# Patient Record
Sex: FEMALE | Race: WHITE | Employment: FULL TIME | URBAN - METROPOLITAN AREA
[De-identification: names, ages, dates, MRNs, and addresses within clinical notes are randomized per-mention and may not be internally consistent; named-entity substitution may affect disease eponyms.]

---

## 2019-12-05 ENCOUNTER — OFFICE VISIT (OUTPATIENT)
Dept: ORTHOPEDIC SURGERY | Age: 44
End: 2019-12-05
Payer: COMMERCIAL

## 2019-12-05 VITALS
HEIGHT: 64 IN | HEART RATE: 52 BPM | DIASTOLIC BLOOD PRESSURE: 74 MMHG | BODY MASS INDEX: 25.61 KG/M2 | SYSTOLIC BLOOD PRESSURE: 114 MMHG | WEIGHT: 150 LBS

## 2019-12-05 DIAGNOSIS — S46.012A TRAUMATIC COMPLETE TEAR OF LEFT ROTATOR CUFF, INITIAL ENCOUNTER: ICD-10-CM

## 2019-12-05 DIAGNOSIS — M25.512 ACUTE PAIN OF LEFT SHOULDER: Primary | ICD-10-CM

## 2019-12-05 DIAGNOSIS — M75.22 BICEPS TENDINITIS OF LEFT UPPER EXTREMITY: ICD-10-CM

## 2019-12-05 PROCEDURE — 99203 OFFICE O/P NEW LOW 30 MIN: CPT | Performed by: ORTHOPAEDIC SURGERY

## 2019-12-05 RX ORDER — DICYCLOMINE HYDROCHLORIDE 10 MG/1
CAPSULE ORAL
Refills: 1 | COMMUNITY
Start: 2019-11-27

## 2019-12-05 RX ORDER — DIAZEPAM 5 MG/1
5 TABLET ORAL
Qty: 1 TABLET | Refills: 0 | Status: SHIPPED | OUTPATIENT
Start: 2019-12-05 | End: 2019-12-05

## 2019-12-05 ASSESSMENT — ENCOUNTER SYMPTOMS
GASTROINTESTINAL NEGATIVE: 1
EYES NEGATIVE: 1
ALLERGIC/IMMUNOLOGIC NEGATIVE: 1
RESPIRATORY NEGATIVE: 1

## 2019-12-12 ENCOUNTER — OFFICE VISIT (OUTPATIENT)
Dept: ORTHOPEDIC SURGERY | Age: 44
End: 2019-12-12
Payer: COMMERCIAL

## 2019-12-12 VITALS
HEIGHT: 64 IN | WEIGHT: 150 LBS | BODY MASS INDEX: 25.61 KG/M2 | DIASTOLIC BLOOD PRESSURE: 76 MMHG | HEART RATE: 59 BPM | SYSTOLIC BLOOD PRESSURE: 114 MMHG | RESPIRATION RATE: 12 BRPM

## 2019-12-12 DIAGNOSIS — M25.512 ACUTE PAIN OF LEFT SHOULDER: Primary | ICD-10-CM

## 2019-12-12 DIAGNOSIS — M75.42 IMPINGEMENT SYNDROME OF LEFT SHOULDER: ICD-10-CM

## 2019-12-12 DIAGNOSIS — M75.22 BICEPS TENDINITIS OF LEFT UPPER EXTREMITY: ICD-10-CM

## 2019-12-12 PROCEDURE — 99212 OFFICE O/P EST SF 10 MIN: CPT | Performed by: ORTHOPAEDIC SURGERY

## 2019-12-12 RX ORDER — METHYLPREDNISOLONE 4 MG/1
TABLET ORAL
Qty: 1 KIT | Refills: 0 | Status: SHIPPED | OUTPATIENT
Start: 2019-12-12

## 2019-12-23 ENCOUNTER — HOSPITAL ENCOUNTER (OUTPATIENT)
Dept: PHYSICAL THERAPY | Age: 44
Setting detail: THERAPIES SERIES
Discharge: HOME | End: 2019-12-23
Payer: COMMERCIAL

## 2019-12-23 PROCEDURE — 97112 NEUROMUSCULAR REEDUCATION: CPT | Performed by: PHYSICAL THERAPIST

## 2019-12-23 PROCEDURE — 97161 PT EVAL LOW COMPLEX 20 MIN: CPT | Performed by: PHYSICAL THERAPIST

## 2019-12-23 PROCEDURE — 97110 THERAPEUTIC EXERCISES: CPT | Performed by: PHYSICAL THERAPIST

## 2019-12-30 ENCOUNTER — APPOINTMENT (OUTPATIENT)
Dept: PHYSICAL THERAPY | Age: 44
End: 2019-12-30
Payer: COMMERCIAL

## 2023-09-19 DIAGNOSIS — H91.90 HEARING LOSS, UNSPECIFIED HEARING LOSS TYPE, UNSPECIFIED LATERALITY: Primary | ICD-10-CM

## 2023-09-20 NOTE — PROGRESS NOTES
perforation. Acoustic Reflexes: Ipsilateral: Could not maintain seal. Contralateral: Could not maintain seal.    LEFT EAR:  Hearing Sensitivity: Normal hearing sensitivity   Speech Recognition Threshold: 20 dB HL  Word Recognition:Excellent (100%), based on NU-6 25-word list at 55 dBHL using recorded speech stimuli. Tympanometry: Normal peak pressure and compliance, Type A tympanogram, consistent with normal middle ear function. Acoustic Reflexes: Ipsilateral: Could not maintain seal. Contralateral: Could not maintain seal.    Reliability: Good  Transducer: Inserts    See scanned audiogram dated 9/21/2023  for results. PATIENT EDUCATION:     The following items were discussed with the patient:   - Good Communication Strategies  - Hearing Loss and Hearing Aids    Educational information was shared in the After Visit Summary. RECOMMENDATIONS:                                                                                                                                                                                                                                                          The following items are recommended based on patient report and results from today's appointment:   - Continue medical follow-up with Jamison Bullard MD.   - Retest hearing as medically indicated and/or sooner if a change in hearing is noted. - If desired, schedule a Hearing Aid Evaluation (HAE) appointment to discuss hearing aid options. - Utilize \"Good Communication Strategies\" as discussed to assist in speech understanding with communication partners. - Use hearing protection devices (HPDs), such as protective ear muffs and ear plugs, when exposed to dangerous sound levels.        Calixto Guillaume  Audiologist    Chart CC'd to: Jamison Bullard MD      Degree of   Hearing Sensitivity dB Range   Within Normal Limits (WNL) 0 - 20   Mild 25 - 40   Moderate 45 - 55

## 2023-09-20 NOTE — PROGRESS NOTES
Bart Floyd   1975, 52 y.o. female   [id number]     Zac Lopez was seen today, 9/21/2023, for earmold impressions to order custom molds for swim plugs. Pre- and post- impression otoscopy was unremarkable and revealed clear ear canals bilaterally. Date: 9/21/2023     PROCEDURES:      Explained to patient $30.00 impression fee and $75.00 ear mold fee due at time of fitting. Patient reported understanding and decided to continue with ear mold impression today. Pre-impression otoscopy revealed clear canals, AD. Impressions taken without incident, bilaterally. Post-impression otoscopy clear, AD.    RECOMMENDATIONS:     Return for Ear mold fitting as scheduled.      NO CHARGE, will pay in full at     Calixto Davey  Audiologist

## 2023-09-21 ENCOUNTER — OFFICE VISIT (OUTPATIENT)
Dept: ENT CLINIC | Age: 48
End: 2023-09-21
Payer: COMMERCIAL

## 2023-09-21 ENCOUNTER — PROCEDURE VISIT (OUTPATIENT)
Dept: AUDIOLOGY | Age: 48
End: 2023-09-21

## 2023-09-21 ENCOUNTER — PROCEDURE VISIT (OUTPATIENT)
Dept: AUDIOLOGY | Age: 48
End: 2023-09-21
Payer: COMMERCIAL

## 2023-09-21 VITALS
RESPIRATION RATE: 16 BRPM | BODY MASS INDEX: 28.68 KG/M2 | SYSTOLIC BLOOD PRESSURE: 143 MMHG | OXYGEN SATURATION: 98 % | HEIGHT: 64 IN | DIASTOLIC BLOOD PRESSURE: 90 MMHG | WEIGHT: 168 LBS | HEART RATE: 81 BPM

## 2023-09-21 DIAGNOSIS — H90.71 MIXED CONDUCTIVE AND SENSORINEURAL HEARING LOSS OF RIGHT EAR WITH UNRESTRICTED HEARING OF LEFT EAR: Primary | ICD-10-CM

## 2023-09-21 DIAGNOSIS — H66.90 CHRONIC OTITIS MEDIA, UNSPECIFIED OTITIS MEDIA TYPE: Primary | ICD-10-CM

## 2023-09-21 DIAGNOSIS — H90.71 MIXED CONDUCTIVE AND SENSORINEURAL HEARING LOSS OF RIGHT EAR WITH UNRESTRICTED HEARING OF LEFT EAR: ICD-10-CM

## 2023-09-21 PROCEDURE — NBSRV NON-BILLABLE SERVICE: Performed by: AUDIOLOGIST

## 2023-09-21 PROCEDURE — 99203 OFFICE O/P NEW LOW 30 MIN: CPT | Performed by: OTOLARYNGOLOGY

## 2023-09-21 PROCEDURE — 92557 COMPREHENSIVE HEARING TEST: CPT | Performed by: AUDIOLOGIST

## 2023-09-21 PROCEDURE — 1036F TOBACCO NON-USER: CPT | Performed by: OTOLARYNGOLOGY

## 2023-09-21 PROCEDURE — G8419 CALC BMI OUT NRM PARAM NOF/U: HCPCS | Performed by: OTOLARYNGOLOGY

## 2023-09-21 PROCEDURE — G8427 DOCREV CUR MEDS BY ELIG CLIN: HCPCS | Performed by: OTOLARYNGOLOGY

## 2023-09-21 PROCEDURE — 92567 TYMPANOMETRY: CPT | Performed by: AUDIOLOGIST

## 2023-09-21 NOTE — PROGRESS NOTES
[address]      Patient Name: [id number] Record Number:  [id number]  Primary Care Physician:  Oliver Tinsley  Date of Consultation: 9/21/2023    Chief Complaint: Right ear issues        HISTORY OF PRESENT Ninoska Donohue is a(n) 52 y.o. female who presents for evaluation of right ear issues. The patient has had a long history of ear issues. She had frequent ear infections as a child. She has had multiple different ear tubes. When she got older it was really just the right ear. Over a decade ago she had a more permanent tube placed on the right side. She has not had issues in a long time. She has not had drainage or pain. The left ear has been fine. She feels as though she has some hearing loss on the right side. 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 REVIEW OF SYSTEMS  As above  PHYSICAL EXAM  GENERAL: No Acute Distress, Alert and Oriented, no Hoarseness, strong voice  EYES: EOMI, Anti-icteric  HENT:   Head: Normocephalic and atraumatic. Face:  Symmetric, facial nerve intact, no sinus tenderness  Ears: See below  Mouth/Oral Cavity:  normal lips, Uvula is midline, no mucosal lesions, no trismus  Oropharynx/Larynx:  normal oropharynx  Nose:Normal external nasal appearance. NECK: Normal range of motion, no thyromegaly, trachea is midline, no lymphadenopathy, no neck masses, no crepitus      Patient had an audiogram.  On the right side she has mild mixed hearing loss. On the left side she has normal hearing    Bilateral ear exam.  The right ear was visualized binocular scope. A little bit of wax removed from an ear tube. The ear tube is in place and patent with a dry middle ear. On the left side tympanic membrane intact with aerated middle ear    I reviewed the patient's history.   She had seen an ear nose throat doctor a few years ago who stated that he had placed the right ear tube in

## 2023-10-19 ENCOUNTER — PROCEDURE VISIT (OUTPATIENT)
Dept: AUDIOLOGY | Age: 48
End: 2023-10-19
Payer: COMMERCIAL

## 2023-10-19 DIAGNOSIS — H90.71 MIXED CONDUCTIVE AND SENSORINEURAL HEARING LOSS OF RIGHT EAR WITH UNRESTRICTED HEARING OF LEFT EAR: Primary | ICD-10-CM

## 2023-10-19 PROCEDURE — V5264 EAR MOLD/INSERT: HCPCS | Performed by: AUDIOLOGIST

## 2023-10-19 PROCEDURE — V5275 EAR IMPRESSION: HCPCS | Performed by: AUDIOLOGIST

## 2023-10-19 NOTE — PROGRESS NOTES
CUSTOM EAR PLUG FITTING        RIGHT EAR: /MODELAlvah Manners Plug  SN: [id number]  FIT DATE: 10/19/23  WARRANTY: 12/27/23        Camilo Noel was seen today,10/19/2023, to be fit with [facility] Sleep Plugs. Fit is good         Camilo Noel noted good fit. Patient understands that she has a 90 day re-make warranty and should contact audiology if adjustments need to be made to the earmolds. PATIENT EDUCATION:         Camilo Noel was provided with the Hearing Aid Fitting Checklist as a reference of items discussed at today's appointment. Patient may find additional product information in the provided custom ear mold  manual.        Unbundled Billing- see associated fees and codes below:     Description Code Amount   Earmold impression  $30.00   Custom Earmold   $75.00         Total cost: $105.00.    RECOMMENDATIONS:      Return for follow-up as needed        Calixto Rodriguez  Audiologist     Bill to insurance: $105.00

## 2025-02-24 ENCOUNTER — OFFICE VISIT (OUTPATIENT)
Age: 50
End: 2025-02-24

## 2025-02-24 VITALS
BODY MASS INDEX: 24.72 KG/M2 | SYSTOLIC BLOOD PRESSURE: 137 MMHG | WEIGHT: 144 LBS | TEMPERATURE: 97.9 F | DIASTOLIC BLOOD PRESSURE: 91 MMHG | HEART RATE: 62 BPM | OXYGEN SATURATION: 98 %

## 2025-02-24 DIAGNOSIS — R03.0 ELEVATED BLOOD PRESSURE READING IN OFFICE WITHOUT DIAGNOSIS OF HYPERTENSION: ICD-10-CM

## 2025-02-24 DIAGNOSIS — R30.0 DYSURIA: Primary | ICD-10-CM

## 2025-02-24 LAB
BILIRUBIN, POC: NEGATIVE
BLOOD URINE, POC: ABNORMAL
CLARITY, POC: CLEAR
COLOR, POC: YELLOW
GLUCOSE URINE, POC: NEGATIVE MG/DL
KETONES, POC: NEGATIVE MG/DL
LEUKOCYTE EST, POC: ABNORMAL
NITRITE, POC: NEGATIVE
PH, POC: 5.5
PROTEIN, POC: 30 MG/DL
SPECIFIC GRAVITY, POC: <=1.005
UROBILINOGEN, POC: 0.2 MG/DL

## 2025-02-24 RX ORDER — DICYCLOMINE HYDROCHLORIDE 10 MG/1
10 CAPSULE ORAL 4 TIMES DAILY PRN
COMMUNITY
Start: 2024-10-24

## 2025-02-24 RX ORDER — ROSUVASTATIN CALCIUM 10 MG/1
10 TABLET, COATED ORAL EVERY EVENING
COMMUNITY
Start: 2024-10-04 | End: 2025-04-02

## 2025-02-24 RX ORDER — NITROFURANTOIN 25; 75 MG/1; MG/1
100 CAPSULE ORAL 2 TIMES DAILY
Qty: 10 CAPSULE | Refills: 0 | Status: SHIPPED | OUTPATIENT
Start: 2025-02-24 | End: 2025-02-24

## 2025-02-24 RX ORDER — NITROFURANTOIN 25; 75 MG/1; MG/1
100 CAPSULE ORAL 2 TIMES DAILY
Qty: 10 CAPSULE | Refills: 0 | Status: SHIPPED | OUTPATIENT
Start: 2025-02-24 | End: 2025-03-01

## 2025-02-25 ASSESSMENT — ENCOUNTER SYMPTOMS
ABDOMINAL PAIN: 0
SHORTNESS OF BREATH: 0
NAUSEA: 0
CONSTIPATION: 0
COUGH: 0
VOMITING: 0
CHEST TIGHTNESS: 0
DIARRHEA: 0

## 2025-02-25 NOTE — PROGRESS NOTES
Letha Nix (:  1975) is a 49 y.o. female,New patient, here for evaluation of the following chief complaint(s):  Dysuria (Symptoms began 2 days ago ) and Hematuria      ASSESSMENT/PLAN:    ICD-10-CM    1. Dysuria  R30.0 POCT Urinalysis no Micro     nitrofurantoin, macrocrystal-monohydrate, (MACROBID) 100 MG capsule     DISCONTINUED: nitrofurantoin, macrocrystal-monohydrate, (MACROBID) 100 MG capsule      2. Elevated blood pressure reading in office without diagnosis of hypertension  R03.0 Non [facility] - External Referral To Primary Care          Patient presents for dysuria.  Urinary frequency.  On exam no CVA tenderness.  POCT this does show Large leukocytes negative nitrites.  Large blood in her urine.  This is consistent with UTI, will prescribe Macrobid.  Had a conversation with the patient about possible urine culture.  Patient would not like culture at this time.  Will treat with Macrobid.  Patient educated on return precautions.  Patient does have elevated blood pressure reading here and repeat is still elevated. Does no take antihypertensive medication.  Patient advised to repeat blood pressure at home or at pharmacy.  Will give referral to PCP.      SUBJECTIVE/OBJECTIVE:    History provided by:  Patient   used: No      HPI:   49 y.o. female presents with symptoms of dysuria and urinary frequency and hematuria ongoing since about 2 days. She does have history of UTI infections and this does feel similar in nature.  Patient states that she has taken cranberry pills for her symptoms.  Which has helped in the past.      Vitals:    25 1851 25 1854   BP: (!) 157/103 (!) 137/91   Site: Left Upper Arm Right Upper Arm   Position: Sitting Sitting   Cuff Size: Small Adult Large Adult   Pulse: 62    Temp: 97.9 °F (36.6 °C)    TempSrc: Oral    SpO2: 98%    Weight: 65.3 kg (144 lb)        Review of Systems   Constitutional:  Negative for fatigue and fever.   Respiratory: